# Patient Record
Sex: FEMALE | URBAN - METROPOLITAN AREA
[De-identification: names, ages, dates, MRNs, and addresses within clinical notes are randomized per-mention and may not be internally consistent; named-entity substitution may affect disease eponyms.]

---

## 2019-08-07 ENCOUNTER — OFFICE VISIT (OUTPATIENT)
Dept: URBAN - METROPOLITAN AREA CLINIC 76 | Facility: CLINIC | Age: 64
End: 2019-08-07
Payer: MEDICARE

## 2019-08-07 DIAGNOSIS — H25.813 COMBINED FORMS OF AGE-RELATED CATARACT, BILATERAL: ICD-10-CM

## 2019-08-07 DIAGNOSIS — L98.9: ICD-10-CM

## 2019-08-07 DIAGNOSIS — E10.9 TYPE 1 DIABETES MELLITUS W/O COMPLICATION: Primary | ICD-10-CM

## 2019-08-07 DIAGNOSIS — H52.223 REGULAR ASTIGMATISM, BILATERAL: ICD-10-CM

## 2019-08-07 PROCEDURE — 99204 OFFICE O/P NEW MOD 45 MIN: CPT | Performed by: OPTOMETRIST

## 2019-08-07 ASSESSMENT — KERATOMETRY
OD: 45.75
OS: 45.75

## 2019-08-07 ASSESSMENT — INTRAOCULAR PRESSURE
OS: 16
OD: 16

## 2019-08-07 ASSESSMENT — VISUAL ACUITY
OD: 20/30
OS: 20/30

## 2019-08-07 NOTE — IMPRESSION/PLAN
Impression: Combined forms of age-related cataract, bilateral: H25.813. Plan: Cataracts affecting vision some, but no surgery is currently recommended. The patient will monitor vision changes and contact us with any decrease in vision. Continue to monitor. Will probably need cataract surgery in next 1-2 yrs.

## 2019-08-07 NOTE — IMPRESSION/PLAN
Impression: Type 1 diabetes mellitus w/o complication: R49.5. Plan: DM: No diabetic retinopathy. Discussed ocular and systemic benefits of blood sugar control. Patient was instructed to monitor vision for changes and to call if noted.

## 2019-08-07 NOTE — IMPRESSION/PLAN
Impression: Disorder of the skin: L98.9. suspicious lesion of RLL. Noticed 2 yrs ago. Ulceration and telangiectasias. Plan: Discussed. Advise urgent visit to oculoplastic surgeon or dermatologist for further evaluation and likely excision. Explained danger without treatment.

## 2019-08-23 ENCOUNTER — OFFICE VISIT (OUTPATIENT)
Dept: URBAN - METROPOLITAN AREA CLINIC 33 | Facility: CLINIC | Age: 64
End: 2019-08-23
Payer: MEDICARE

## 2019-08-23 DIAGNOSIS — D48.1 NEOPLASM OF UNCERTAIN BEHAVIOR OF CONNECTIVE AND OTHER SOFT TISSUE: Primary | ICD-10-CM

## 2019-08-23 PROCEDURE — 92285 EXTERNAL OCULAR PHOTOGRAPHY: CPT | Performed by: OPHTHALMOLOGY

## 2019-08-23 PROCEDURE — 99204 OFFICE O/P NEW MOD 45 MIN: CPT | Performed by: OPHTHALMOLOGY

## 2019-08-23 NOTE — IMPRESSION/PLAN
Impression: Neoplasm of uncertain behavior of connective and other soft tissue: D48.1. Photo Interpretation: supports clinical findings and dx documented in chart. Plan: Discussed diagnosis in detail with patient. Discussed treatment options with patient. Recommend biopsy, obtain prior authorization, further conduct after biopsy results. RBA's discussed in detail with patient today, patient understands and agrees and wishes to proceed with procedure. Schedule excision of suspicious looking lesion right lower eyelid with biopsy, 58754 E4.

## 2019-09-13 ENCOUNTER — PROCEDURE (OUTPATIENT)
Dept: URBAN - METROPOLITAN AREA CLINIC 33 | Facility: CLINIC | Age: 64
End: 2019-09-13
Payer: MEDICARE

## 2019-09-13 PROCEDURE — 67840 REMOVE EYELID LESION: CPT | Performed by: OPHTHALMOLOGY

## 2019-09-13 RX ORDER — ERYTHROMYCIN 5 MG/G
OINTMENT OPHTHALMIC
Qty: 1 | Refills: 0 | Status: INACTIVE
Start: 2019-09-13 | End: 2019-09-27

## 2019-09-27 ENCOUNTER — OFFICE VISIT (OUTPATIENT)
Dept: URBAN - METROPOLITAN AREA CLINIC 45 | Facility: CLINIC | Age: 64
End: 2019-09-27
Payer: MEDICARE

## 2019-09-27 DIAGNOSIS — C44.1122 BASAL CELL CARCINOMA OF SKIN OF RIGHT LOWER EYELID, INCLUDING CANTHUS: Primary | ICD-10-CM

## 2019-09-27 PROCEDURE — 99214 OFFICE O/P EST MOD 30 MIN: CPT | Performed by: OPHTHALMOLOGY

## 2019-09-27 RX ORDER — CEPHALEXIN 500 MG/1
500 MG CAPSULE ORAL
Qty: 15 | Refills: 0 | Status: INACTIVE
Start: 2019-09-27 | End: 2020-03-25

## 2019-09-27 RX ORDER — ERYTHROMYCIN 5 MG/G
OINTMENT OPHTHALMIC
Qty: 2 | Refills: 0 | Status: INACTIVE
Start: 2019-09-27 | End: 2020-01-08

## 2019-09-27 ASSESSMENT — INTRAOCULAR PRESSURE
OD: 12
OS: 18

## 2019-09-27 NOTE — IMPRESSION/PLAN
Impression: Basal cell carcinoma of skin of right lower eyelid, including canthus: C44.1122. S/P Excision with biopsy right lower eyelid. No LAD's. Photo Interpretation: supports clinical findings and dx documented in chart. Plan: Discussed diagnosis in detail with patient. Discussed treatment options with patient. Surgical risks and benefits were discussed, explained and understood by patient. Biopsy reviewed with patient today. Surgical treatment is required. Patient elects to have surgery. Recommend MOHS surgery with MOHS surgeon (Dr. Maura Hylton) then reconstruction of right lower eyelid done by myself. Will schedule surgery at outside surgical center with general anesthesia capabilities. RL2.

## 2019-12-18 ENCOUNTER — Encounter (OUTPATIENT)
Dept: URBAN - METROPOLITAN AREA EXTERNAL CLINIC 25 | Facility: EXTERNAL CLINIC | Age: 64
End: 2019-12-18
Payer: MEDICARE

## 2019-12-18 PROCEDURE — 14060 TIS TRNFR E/N/E/L 10 SQ CM/<: CPT | Performed by: OPHTHALMOLOGY

## 2020-01-08 ENCOUNTER — POST-OPERATIVE VISIT (OUTPATIENT)
Dept: URBAN - METROPOLITAN AREA CLINIC 33 | Facility: CLINIC | Age: 65
End: 2020-01-08

## 2020-01-08 PROCEDURE — 99024 POSTOP FOLLOW-UP VISIT: CPT | Performed by: OPHTHALMOLOGY

## 2020-01-08 RX ORDER — NEOMYCIN SULFATE, POLYMYXIN B SULFATE AND DEXAMETHASONE 3.5; 10000; 1 MG/G; [USP'U]/G; MG/G
OINTMENT OPHTHALMIC
Qty: 2 | Refills: 0 | Status: INACTIVE
Start: 2020-01-08 | End: 2021-01-06

## 2020-01-08 ASSESSMENT — INTRAOCULAR PRESSURE
OS: 16
OD: 16

## 2020-02-28 ENCOUNTER — POST-OPERATIVE VISIT (OUTPATIENT)
Dept: URBAN - METROPOLITAN AREA CLINIC 33 | Facility: CLINIC | Age: 65
End: 2020-02-28

## 2020-02-28 PROCEDURE — 99024 POSTOP FOLLOW-UP VISIT: CPT | Performed by: OPHTHALMOLOGY

## 2020-03-25 ENCOUNTER — OFFICE VISIT (OUTPATIENT)
Dept: URBAN - METROPOLITAN AREA CLINIC 33 | Facility: CLINIC | Age: 65
End: 2020-03-25
Payer: MEDICARE

## 2020-03-25 DIAGNOSIS — Z85.828 PERSONAL HISTORY OF OTHER CANCER OF SKIN: ICD-10-CM

## 2020-03-25 PROCEDURE — 92285 EXTERNAL OCULAR PHOTOGRAPHY: CPT | Performed by: OPHTHALMOLOGY

## 2020-03-25 PROCEDURE — 99214 OFFICE O/P EST MOD 30 MIN: CPT | Performed by: OPHTHALMOLOGY

## 2020-03-25 RX ORDER — CEPHALEXIN 500 MG/1
500 MG CAPSULE ORAL
Qty: 15 | Refills: 0 | Status: INACTIVE
Start: 2020-03-25 | End: 2020-07-10

## 2020-03-25 RX ORDER — ERYTHROMYCIN 5 MG/G
OINTMENT OPHTHALMIC
Qty: 1 | Refills: 0 | Status: INACTIVE
Start: 2020-03-25 | End: 2020-04-13

## 2020-03-25 ASSESSMENT — INTRAOCULAR PRESSURE
OD: 16
OS: 14

## 2020-03-25 NOTE — IMPRESSION/PLAN
Impression: Basal cell carcinoma of skin of right lower eyelid, including canthus: C44.1122. Plan: See plan #1.

## 2020-03-25 NOTE — IMPRESSION/PLAN
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 Photo Interpretation: supports clinical findings and dx documented in chart. Plan: Discussed diagnosis in detail with patient. Discussed treatment options with patient. Surgical risks and benefits were discussed, explained and understood by patient. Surgical treatment is required. Patient elects to proceed with surgery. Recommend Correction by Canthoplasty of the Right Lower Eyelid Ectropion Repair. RL2. *Medically Necessary in order to prevent permanent corneal damage and/or spread infection. Patient to start Erythromycin BID until day of surgery. *Patient is currently being treated during COVID-19 epidemic, which limits our availability to establish proper follow up care. Patient understands these limitations, and is aware that we will continue treatment and follow up based on our availability, as determined by local state and federal guidelines.

## 2020-04-06 ENCOUNTER — SURGERY (OUTPATIENT)
Dept: URBAN - METROPOLITAN AREA SURGERY 15 | Facility: SURGERY | Age: 65
End: 2020-04-06
Payer: MEDICARE

## 2020-04-06 PROCEDURE — 67950 REVISION OF EYELID: CPT | Performed by: OPHTHALMOLOGY

## 2020-04-06 RX ORDER — TRAMADOL HYDROCHLORIDE 50 MG/1
50 MG TABLET, FILM COATED ORAL
Qty: 20 | Refills: 0 | Status: INACTIVE
Start: 2020-04-06 | End: 2020-04-10

## 2020-04-07 ENCOUNTER — POST-OPERATIVE VISIT (OUTPATIENT)
Dept: URBAN - METROPOLITAN AREA CLINIC 33 | Facility: CLINIC | Age: 65
End: 2020-04-07

## 2020-04-07 PROCEDURE — 99024 POSTOP FOLLOW-UP VISIT: CPT | Performed by: OPTOMETRIST

## 2020-04-07 ASSESSMENT — INTRAOCULAR PRESSURE
OS: 14
OD: 14

## 2020-04-13 ENCOUNTER — POST-OPERATIVE VISIT (OUTPATIENT)
Dept: URBAN - METROPOLITAN AREA CLINIC 33 | Facility: CLINIC | Age: 65
End: 2020-04-13

## 2020-04-13 DIAGNOSIS — Z09 ENCNTR FOR F/U EXAM AFT TRTMT FOR COND OTH THAN MALIG NEOPLM: Primary | ICD-10-CM

## 2020-04-13 PROCEDURE — 99024 POSTOP FOLLOW-UP VISIT: CPT | Performed by: OPHTHALMOLOGY

## 2020-04-13 RX ORDER — ERYTHROMYCIN 5 MG/G
OINTMENT OPHTHALMIC
Qty: 1 | Refills: 0 | Status: INACTIVE
Start: 2020-04-13 | End: 2020-07-10

## 2020-04-13 ASSESSMENT — INTRAOCULAR PRESSURE
OS: 13
OD: 15

## 2020-04-29 ENCOUNTER — POST-OPERATIVE VISIT (OUTPATIENT)
Dept: URBAN - METROPOLITAN AREA CLINIC 33 | Facility: CLINIC | Age: 65
End: 2020-04-29

## 2020-04-29 PROCEDURE — 99024 POSTOP FOLLOW-UP VISIT: CPT | Performed by: OPHTHALMOLOGY

## 2020-04-29 RX ORDER — METHYLPREDNISOLONE 4 MG/1
4 MG TABLET ORAL
Qty: 1 | Refills: 0 | Status: INACTIVE
Start: 2020-04-29 | End: 2021-01-06

## 2020-05-22 ENCOUNTER — POST-OPERATIVE VISIT (OUTPATIENT)
Dept: URBAN - METROPOLITAN AREA CLINIC 33 | Facility: CLINIC | Age: 65
End: 2020-05-22

## 2020-05-22 PROCEDURE — 99024 POSTOP FOLLOW-UP VISIT: CPT | Performed by: OPHTHALMOLOGY

## 2020-07-10 ENCOUNTER — POST-OPERATIVE VISIT (OUTPATIENT)
Dept: URBAN - METROPOLITAN AREA CLINIC 33 | Facility: CLINIC | Age: 65
End: 2020-07-10

## 2020-07-10 PROCEDURE — 99024 POSTOP FOLLOW-UP VISIT: CPT | Performed by: OPHTHALMOLOGY

## 2020-07-10 RX ORDER — CEPHALEXIN 500 MG/1
500 MG CAPSULE ORAL
Qty: 15 | Refills: 0 | Status: INACTIVE
Start: 2020-07-10 | End: 2021-01-06

## 2020-07-10 RX ORDER — ERYTHROMYCIN 5 MG/G
OINTMENT OPHTHALMIC
Qty: 1 | Refills: 3 | Status: INACTIVE
Start: 2020-07-10 | End: 2021-01-06

## 2020-07-27 ENCOUNTER — SURGERY (OUTPATIENT)
Dept: URBAN - METROPOLITAN AREA SURGERY 15 | Facility: SURGERY | Age: 65
End: 2020-07-27
Payer: MEDICARE

## 2020-07-27 PROCEDURE — 67917 REPAIR EYELID DEFECT: CPT | Performed by: OPHTHALMOLOGY

## 2020-07-27 RX ORDER — TRAMADOL HYDROCHLORIDE 50 MG/1
50 MG TABLET, FILM COATED ORAL
Qty: 20 | Refills: 0 | Status: INACTIVE
Start: 2020-07-27 | End: 2020-07-31

## 2020-07-29 ENCOUNTER — POST-OPERATIVE VISIT (OUTPATIENT)
Dept: URBAN - METROPOLITAN AREA CLINIC 33 | Facility: CLINIC | Age: 65
End: 2020-07-29

## 2020-07-29 PROCEDURE — 99024 POSTOP FOLLOW-UP VISIT: CPT | Performed by: OPTOMETRIST

## 2020-07-29 ASSESSMENT — INTRAOCULAR PRESSURE: OD: 12

## 2020-08-14 ENCOUNTER — POST-OPERATIVE VISIT (OUTPATIENT)
Dept: URBAN - METROPOLITAN AREA CLINIC 33 | Facility: CLINIC | Age: 65
End: 2020-08-14

## 2020-08-14 PROCEDURE — 99024 POSTOP FOLLOW-UP VISIT: CPT | Performed by: OPHTHALMOLOGY

## 2020-08-14 RX ORDER — LOTEPREDNOL ETABONATE 5 MG/G
0.5 % OINTMENT OPHTHALMIC
Qty: 1 | Refills: 0 | Status: INACTIVE
Start: 2020-08-14 | End: 2020-08-18

## 2020-08-14 ASSESSMENT — INTRAOCULAR PRESSURE
OD: 13
OS: 13

## 2020-08-21 ENCOUNTER — POST-OPERATIVE VISIT (OUTPATIENT)
Dept: URBAN - METROPOLITAN AREA CLINIC 33 | Facility: CLINIC | Age: 65
End: 2020-08-21

## 2020-08-21 PROCEDURE — 99024 POSTOP FOLLOW-UP VISIT: CPT | Performed by: OPHTHALMOLOGY

## 2020-08-21 ASSESSMENT — INTRAOCULAR PRESSURE
OS: 13
OD: 14

## 2020-12-02 ENCOUNTER — OFFICE VISIT (OUTPATIENT)
Dept: URBAN - METROPOLITAN AREA CLINIC 32 | Facility: CLINIC | Age: 65
End: 2020-12-02
Payer: COMMERCIAL

## 2020-12-02 PROCEDURE — 92014 COMPRE OPH EXAM EST PT 1/>: CPT | Performed by: OPTOMETRIST

## 2020-12-02 ASSESSMENT — VISUAL ACUITY: OD: 20/30

## 2020-12-02 ASSESSMENT — INTRAOCULAR PRESSURE
OS: 12
OD: 10

## 2020-12-02 ASSESSMENT — KERATOMETRY
OD: 45.75
OS: 46.00

## 2020-12-02 NOTE — IMPRESSION/PLAN
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  Plan: refer for eval with Dr Zina Mohan

## 2021-01-06 ENCOUNTER — OFFICE VISIT (OUTPATIENT)
Dept: URBAN - METROPOLITAN AREA CLINIC 33 | Facility: CLINIC | Age: 66
End: 2021-01-06
Payer: MEDICARE

## 2021-01-06 DIAGNOSIS — H02.112 CICATRICIAL ECTROPION OF RIGHT LOWER EYELID: Primary | ICD-10-CM

## 2021-01-06 PROCEDURE — 99214 OFFICE O/P EST MOD 30 MIN: CPT | Performed by: OPHTHALMOLOGY

## 2021-01-06 RX ORDER — ERYTHROMYCIN 5 MG/G
OINTMENT OPHTHALMIC
Qty: 2 | Refills: 0 | Status: INACTIVE
Start: 2021-01-06 | End: 2021-02-19

## 2021-01-06 RX ORDER — CEPHALEXIN 500 MG/1
500 MG CAPSULE ORAL
Qty: 15 | Refills: 0 | Status: INACTIVE
Start: 2021-01-06 | End: 2021-08-11

## 2021-01-06 ASSESSMENT — INTRAOCULAR PRESSURE
OS: 11
OD: 11

## 2021-01-06 NOTE — IMPRESSION/PLAN
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 Plan: Discussed diagnosis in detail with patient. Discussed treatment options with patient. Surgical risks and benefits were discussed, explained and understood by patient. Surgical treatment is required. Patient elects to proceed with surgery. Recommended Fusional Canthoplasty with Permanent Tarsorrhaphy. RL2.

*Patient is currently being treated during COVID-19 epidemic, which limits our availability to establish proper follow up care. Patient understands these limitations, and is aware that we will continue treatment and follow up based on our availability, as determined by local state and federal guidelines.

## 2021-02-08 ENCOUNTER — SURGERY (OUTPATIENT)
Dept: URBAN - METROPOLITAN AREA SURGERY 15 | Facility: SURGERY | Age: 66
End: 2021-02-08
Payer: MEDICARE

## 2021-02-08 PROCEDURE — 67882 REVISION OF EYELID: CPT | Performed by: OPHTHALMOLOGY

## 2021-02-08 RX ORDER — TRAMADOL HYDROCHLORIDE 50 MG/1
50 MG TABLET, FILM COATED ORAL
Qty: 20 | Refills: 0 | Status: INACTIVE
Start: 2021-02-08 | End: 2021-02-12

## 2021-02-09 ENCOUNTER — POST-OPERATIVE VISIT (OUTPATIENT)
Dept: URBAN - METROPOLITAN AREA CLINIC 33 | Facility: CLINIC | Age: 66
End: 2021-02-09

## 2021-02-09 PROCEDURE — 99024 POSTOP FOLLOW-UP VISIT: CPT | Performed by: OPTOMETRIST

## 2021-02-09 ASSESSMENT — INTRAOCULAR PRESSURE
OD: 12
OS: 11

## 2021-02-09 NOTE — IMPRESSION/PLAN
Impression: S/P fusional canthoplasty with permanent tarsorrhaphy; 72701; 13140 OD - 1 Day. Encounter for other specified surgical aftercare  Z48.89. Plan: Keep appointment with Dr. Sagar Monzon.

## 2021-02-19 ENCOUNTER — POST-OPERATIVE VISIT (OUTPATIENT)
Dept: URBAN - METROPOLITAN AREA CLINIC 33 | Facility: CLINIC | Age: 66
End: 2021-02-19

## 2021-02-19 PROCEDURE — 99024 POSTOP FOLLOW-UP VISIT: CPT | Performed by: OPHTHALMOLOGY

## 2021-02-19 RX ORDER — ERYTHROMYCIN 5 MG/G
OINTMENT OPHTHALMIC
Qty: 2 | Refills: 0 | Status: INACTIVE
Start: 2021-02-19 | End: 2021-08-11

## 2021-02-19 ASSESSMENT — INTRAOCULAR PRESSURE
OD: 13
OS: 11

## 2021-02-19 NOTE — IMPRESSION/PLAN
Impression: S/P fusional canthoplasty with permanent tarsorrhaphy; 32941; 74489 OD - 11 Days. Encounter for other specified surgical aftercare  Z48.89. Post operative instructions reviewed - Plan: *Patient is currently being treated during COVID-19 epidemic, which limits our availability to establish proper follow up care. Patient understands these limitations, and is aware that we will continue treatment and follow up based on our availability, as determined by local state and federal guidelines. -- Patient to continue Erythromycin edin at night, for two weeks -- Patient to begin warm compresses BID, for two weeks -- Patient instructed to wash with mild soap and warm water -- Patient to gradually resume normal activities -- Expressed importance of UV Protection

## 2021-03-03 ENCOUNTER — POST-OPERATIVE VISIT (OUTPATIENT)
Dept: URBAN - METROPOLITAN AREA CLINIC 33 | Facility: CLINIC | Age: 66
End: 2021-03-03
Payer: MEDICARE

## 2021-03-03 DIAGNOSIS — Z48.89 ENCOUNTER FOR OTHER SPECIFIED SURGICAL AFTERCARE: Primary | ICD-10-CM

## 2021-03-03 PROCEDURE — 99024 POSTOP FOLLOW-UP VISIT: CPT | Performed by: OPHTHALMOLOGY

## 2021-03-03 NOTE — IMPRESSION/PLAN
Impression: S/P fusional canthoplasty with permanent tarsorrhaphy; 58392; 78378 OD - 23 Days. Encounter for other specified surgical aftercare  Z48.89. Plan: Discussed diagnosis in detail with patient. Discussed treatment plan with patient. Warm compress and ointment  qhs x 2weeks.  Continue AT qiD

## 2021-08-11 ENCOUNTER — OFFICE VISIT (OUTPATIENT)
Dept: URBAN - METROPOLITAN AREA CLINIC 76 | Facility: CLINIC | Age: 66
End: 2021-08-11
Payer: MEDICARE

## 2021-08-11 DIAGNOSIS — H02.102 ECTROPION OF RT LOWER EYELID: Primary | ICD-10-CM

## 2021-08-11 DIAGNOSIS — H16.211 EXPOSURE KERATOCONJUNCTIVITIS, RIGHT EYE: ICD-10-CM

## 2021-08-11 DIAGNOSIS — H10.45 OTHER CHRONIC ALLERGIC CONJUNCTIVITIS: ICD-10-CM

## 2021-08-11 PROCEDURE — 99213 OFFICE O/P EST LOW 20 MIN: CPT | Performed by: OPTOMETRIST

## 2021-08-11 ASSESSMENT — INTRAOCULAR PRESSURE
OD: 13
OS: 12

## 2021-08-11 NOTE — IMPRESSION/PLAN
Impression: Ectropion of rt lower eyelid: H02.102. Right.  s/p fusional canthoplasty w/ permanent tarsorrhaphy RLL. Plan: Discussed with patient. Recommend AT's 4-6 times a day OU and Refresh gel PM QHS OD. D/c e-mycin. Advised patient an oculoplastics consult w/ Dr. Addison Fabry for a second opinion may be necessary in the future if symptoms don't improve. Patient understands.

## 2022-05-13 ENCOUNTER — OFFICE VISIT (OUTPATIENT)
Dept: URBAN - METROPOLITAN AREA CLINIC 76 | Facility: CLINIC | Age: 67
End: 2022-05-13
Payer: MEDICARE

## 2022-05-13 DIAGNOSIS — E11.9 TYPE 2 DIABETES MELLITUS W/O COMPLICATION: Primary | ICD-10-CM

## 2022-05-13 DIAGNOSIS — H16.211 EXPOSURE KERATOCONJUNCTIVITIS, RIGHT EYE: ICD-10-CM

## 2022-05-13 DIAGNOSIS — H02.102 ECTROPION OF RT LOWER EYELID: ICD-10-CM

## 2022-05-13 DIAGNOSIS — H52.223 REGULAR ASTIGMATISM, BILATERAL: ICD-10-CM

## 2022-05-13 DIAGNOSIS — H25.13 AGE-RELATED NUCLEAR CATARACT, BILATERAL: ICD-10-CM

## 2022-05-13 DIAGNOSIS — H04.123 DRY EYE SYNDROME OF BILATERAL LACRIMAL GLANDS: ICD-10-CM

## 2022-05-13 PROCEDURE — 99214 OFFICE O/P EST MOD 30 MIN: CPT | Performed by: OPTOMETRIST

## 2022-05-13 ASSESSMENT — INTRAOCULAR PRESSURE
OD: 13
OS: 14

## 2022-05-13 ASSESSMENT — KERATOMETRY
OS: 45.63
OD: 46.13

## 2022-05-13 ASSESSMENT — VISUAL ACUITY
OD: 20/40
OS: 20/30

## 2022-05-13 NOTE — IMPRESSION/PLAN
Impression: Type 2 diabetes mellitus w/o complication: G57.1. Plan: Diabetes type II: no background diabetic retinopathy, no signs of neovascularization noted. Discussed ocular and systemic benefits of blood sugar control. Continue to monitor blood sugar and continue care with PCP. Advised patient to RTC immediately if changes to vision are noted. Continue to monitor for changes.

## 2022-05-13 NOTE — IMPRESSION/PLAN
Impression: Ectropion of rt lower eyelid: H02.102. Right.  s/p fusional canthoplasty w/ permanent tarsorrhaphy RLL. Plan: Discussed in detail with patient. Recommend AT's 4-6 times a day OU and Refresh gel OU QHS. Discussed option of an oculoplastics consult w/ Dr. Moreen Boxer for a second opinion may be necessary in the future if symptoms don't improve. Patient understands.